# Patient Record
Sex: MALE
[De-identification: names, ages, dates, MRNs, and addresses within clinical notes are randomized per-mention and may not be internally consistent; named-entity substitution may affect disease eponyms.]

---

## 2024-10-16 ENCOUNTER — P2P PATIENT RECORD (OUTPATIENT)
Age: 59
End: 2024-10-16

## 2025-01-07 ENCOUNTER — OFFICE VISIT (OUTPATIENT)
Dept: URBAN - METROPOLITAN AREA CLINIC 12 | Facility: CLINIC | Age: 60
End: 2025-01-07
Payer: COMMERCIAL

## 2025-01-07 ENCOUNTER — DASHBOARD ENCOUNTERS (OUTPATIENT)
Age: 60
End: 2025-01-07

## 2025-01-07 VITALS
HEIGHT: 71 IN | TEMPERATURE: 97.7 F | HEART RATE: 97 BPM | WEIGHT: 226 LBS | SYSTOLIC BLOOD PRESSURE: 153 MMHG | DIASTOLIC BLOOD PRESSURE: 78 MMHG | BODY MASS INDEX: 31.64 KG/M2

## 2025-01-07 DIAGNOSIS — Z86.0100 PERSONAL HISTORY OF COLONIC POLYPS: ICD-10-CM

## 2025-01-07 DIAGNOSIS — E66.811 OBESITY (BMI 30.0-34.9): ICD-10-CM

## 2025-01-07 DIAGNOSIS — R12 HEARTBURN: ICD-10-CM

## 2025-01-07 DIAGNOSIS — K30 INDIGESTION: ICD-10-CM

## 2025-01-07 PROBLEM — 428283002: Status: ACTIVE | Noted: 2025-01-07

## 2025-01-07 PROBLEM — 162031009: Status: ACTIVE | Noted: 2025-01-07

## 2025-01-07 PROBLEM — 443371000124107: Status: ACTIVE | Noted: 2025-01-07

## 2025-01-07 PROCEDURE — 99204 OFFICE O/P NEW MOD 45 MIN: CPT | Performed by: INTERNAL MEDICINE

## 2025-01-07 NOTE — HPI-TODAY'S VISIT:
59M referred by Dr. Sandoval for GI evaluation for consideratin of EGD/colonoscopy.  Patient states that he has long hx of GERD and dx with possible Barretts on EGD in [place] about 15 years ago. Then had a EGD several years ago and was equivocal by patient report. He states that he had polyps removed on colon about 7-8 yrs ago at the same time in [place] and has not had EGD/colon since then. He takes Prevacid 30mg daily for GERD. Notes improvement with GERD.  No dysphagia or odoynophagia. No early satiety. No melena. No abd pain. Also has CPAP for ADRIANA which has helped. No rectal bleeding. No anemia. No melena.  Has noticed change in BMs with Ozempic and has BM about QOD and prevoiusly was QD.  Has had lower abdominal cramping at times which he attributes to Ozempic. He has lost about 15 pounds with Ozempic. DM is better controlled. Has decreased appetite with Ozempic.  He has apparent lecethin allergy and thinks he received non-propofol medication at last procedure.

## 2025-03-14 ENCOUNTER — TELEPHONE ENCOUNTER (OUTPATIENT)
Dept: URBAN - METROPOLITAN AREA CLINIC 12 | Facility: CLINIC | Age: 60
End: 2025-03-14

## 2025-04-15 ENCOUNTER — TELEPHONE ENCOUNTER (OUTPATIENT)
Dept: URBAN - METROPOLITAN AREA CLINIC 12 | Facility: CLINIC | Age: 60
End: 2025-04-15

## 2025-04-15 PROBLEM — 302914006: Status: ACTIVE | Noted: 2025-04-15

## 2025-04-17 ENCOUNTER — TELEPHONE ENCOUNTER (OUTPATIENT)
Dept: URBAN - METROPOLITAN AREA CLINIC 12 | Facility: CLINIC | Age: 60
End: 2025-04-17

## 2025-04-28 ENCOUNTER — OFFICE VISIT (OUTPATIENT)
Dept: URBAN - METROPOLITAN AREA LAB 3 | Facility: LAB | Age: 60
End: 2025-04-28

## 2025-05-09 ENCOUNTER — OFFICE VISIT (OUTPATIENT)
Dept: URBAN - METROPOLITAN AREA CLINIC 12 | Facility: CLINIC | Age: 60
End: 2025-05-09

## 2025-05-15 ENCOUNTER — OFFICE VISIT (OUTPATIENT)
Dept: URBAN - METROPOLITAN AREA CLINIC 12 | Facility: CLINIC | Age: 60
End: 2025-05-15
Payer: COMMERCIAL

## 2025-05-15 DIAGNOSIS — K22.70 BARRETT'S ESOPHAGUS WITHOUT DYSPLASIA: ICD-10-CM

## 2025-05-15 DIAGNOSIS — K21.9 CHRONIC GERD: ICD-10-CM

## 2025-05-15 DIAGNOSIS — Z12.11 SCREENING FOR COLON CANCER: ICD-10-CM

## 2025-05-15 DIAGNOSIS — K22.9 ESOPHAGEAL LESION: ICD-10-CM

## 2025-05-15 PROBLEM — 300286002: Status: ACTIVE | Noted: 2025-05-15

## 2025-05-15 PROBLEM — 305058001: Status: ACTIVE | Noted: 2025-05-15

## 2025-05-15 PROBLEM — 235595009: Status: ACTIVE | Noted: 2025-05-15

## 2025-05-15 PROCEDURE — 99214 OFFICE O/P EST MOD 30 MIN: CPT

## 2025-05-15 NOTE — HPI-TODAY'S VISIT:
Patient is a 60-year-old male presents today for EGD/colon follow-up. He was seen by Dr. Rudolph on 1/7/2025 for history of GERD, and possible Magaña's on EGD in [place]. History of colon polyps.   Colon on 4/28/2025 with Dr. Rudolph was unremarkable without colon polyps. Repeat colon in 10 years for screening purposes recommended.  EGD was denied by his insurance.   Today, he reports a history of abnormal pre-cancerous ulcers/lesions in his esophagus, identified by EGD 5-10 years ago. He has a chronic history of GERD and has been taking Lansoprazole 30 mg for years. His symptoms are well controlled while on medication but he experiences symptom flares if he misses a dose. Today, he denies dysphagia, acid reflux, heartburn, or epigastric pain. He denies any family history of esophageal or gastric CA. He also denies any problems with his heart, lungs, or kidneys. He has had no trouble with anesthesia in the past.

## 2025-05-27 ENCOUNTER — OFFICE VISIT (OUTPATIENT)
Dept: URBAN - METROPOLITAN AREA SURGERY CENTER 8 | Facility: SURGERY CENTER | Age: 60
End: 2025-05-27

## 2025-06-10 ENCOUNTER — OFFICE VISIT (OUTPATIENT)
Dept: URBAN - METROPOLITAN AREA CLINIC 12 | Facility: CLINIC | Age: 60
End: 2025-06-10

## 2025-06-24 ENCOUNTER — CLAIMS CREATED FROM THE CLAIM WINDOW (OUTPATIENT)
Dept: URBAN - METROPOLITAN AREA CLINIC 4 | Facility: CLINIC | Age: 60
End: 2025-06-24
Payer: COMMERCIAL

## 2025-06-24 ENCOUNTER — OFFICE VISIT (OUTPATIENT)
Dept: URBAN - METROPOLITAN AREA SURGERY CENTER 8 | Facility: SURGERY CENTER | Age: 60
End: 2025-06-24

## 2025-06-24 DIAGNOSIS — K31.89 OTHER DISEASES OF STOMACH AND DUODENUM: ICD-10-CM

## 2025-06-24 DIAGNOSIS — K21.9 GASTRO-ESOPHAGEAL REFLUX DISEASE WITHOUT ESOPHAGITIS: ICD-10-CM

## 2025-06-24 DIAGNOSIS — K31.7 POLYP OF STOMACH AND DUODENUM: ICD-10-CM

## 2025-06-24 PROCEDURE — 88312 SPECIAL STAINS GROUP 1: CPT | Performed by: PATHOLOGY

## 2025-06-24 PROCEDURE — 88305 TISSUE EXAM BY PATHOLOGIST: CPT | Performed by: PATHOLOGY

## 2025-06-24 PROCEDURE — 88342 IMHCHEM/IMCYTCHM 1ST ANTB: CPT | Performed by: PATHOLOGY

## 2025-07-08 ENCOUNTER — OFFICE VISIT (OUTPATIENT)
Dept: URBAN - METROPOLITAN AREA CLINIC 12 | Facility: CLINIC | Age: 60
End: 2025-07-08
Payer: COMMERCIAL

## 2025-07-08 ENCOUNTER — TELEPHONE ENCOUNTER (OUTPATIENT)
Dept: URBAN - METROPOLITAN AREA CLINIC 12 | Facility: CLINIC | Age: 60
End: 2025-07-08

## 2025-07-08 DIAGNOSIS — K22.9 ESOPHAGEAL LESION: ICD-10-CM

## 2025-07-08 DIAGNOSIS — Z86.0101 HISTORY OF ADENOMATOUS POLYP OF COLON: ICD-10-CM

## 2025-07-08 DIAGNOSIS — K44.9 HIATAL HERNIA: ICD-10-CM

## 2025-07-08 DIAGNOSIS — K21.9 CHRONIC GERD: ICD-10-CM

## 2025-07-08 PROBLEM — 84089009: Status: ACTIVE | Noted: 2025-07-08

## 2025-07-08 PROBLEM — 429047008: Status: ACTIVE | Noted: 2025-07-08

## 2025-07-08 PROCEDURE — 99214 OFFICE O/P EST MOD 30 MIN: CPT

## 2025-07-08 NOTE — HPI-TODAY'S VISIT:
59 yo M presents today for an EGD f/u. Hx of GERD and possible schafer's from previous EGD.   EGD on 6/24/25: irregular z-line, medium-sized HH, 1 gastric polyp, gastritis. Path: negativie for Schafer's. hyperplastic polyp.  Patient reports feeling well today.  On lansoprazole 30 mg daily with good symptom control.  Denies any new concerns or symptoms.  Outside records reviewed.  The EGD/colon in 2017.  EGD revealed reflux esophagitis, negative for Schafer's.  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  Last OV note from 5/15/25 Patient is a 60-year-old male presents today for EGD/colon follow-up. He was seen by Dr. Rudolph on 1/7/2025 for history of GERD, and possible Schafer's on EGD in [place]. History of colon polyps.   Colon on 4/28/2025 with Dr. Rudolph was unremarkable without colon polyps. Repeat colon in 10 years for screening purposes recommended.  EGD was denied by his insurance.   Today, he reports a history of abnormal pre-cancerous ulcers/lesions in his esophagus, identified by EGD 5-10 years ago. He has a chronic history of GERD and has been taking Lansoprazole 30 mg for years. His symptoms are well controlled while on medication but he experiences symptom flares if he misses a dose. Today, he denies dysphagia, acid reflux, heartburn, or epigastric pain. He denies any family history of esophageal or gastric CA. He also denies any problems with his heart, lungs, or kidneys. He has had no trouble with anesthesia in the past.